# Patient Record
Sex: FEMALE | URBAN - METROPOLITAN AREA
[De-identification: names, ages, dates, MRNs, and addresses within clinical notes are randomized per-mention and may not be internally consistent; named-entity substitution may affect disease eponyms.]

---

## 2020-12-21 ENCOUNTER — OFFICE VISIT (OUTPATIENT)
Dept: URBAN - METROPOLITAN AREA CLINIC 48 | Facility: CLINIC | Age: 75
End: 2020-12-21
Payer: MEDICARE

## 2020-12-21 PROCEDURE — 92004 COMPRE OPH EXAM NEW PT 1/>: CPT | Performed by: STUDENT IN AN ORGANIZED HEALTH CARE EDUCATION/TRAINING PROGRAM

## 2020-12-21 NOTE — IMPRESSION/PLAN
Impression: Retinal artery branch occlusion, right eye: H34.231. Plan: Discussed with patient dx and how it stroke equivalent recommended immediate transfer to hospital. Patient declines ambulance. Express understanding of need to proceed to hospital immediately.  

Contacted Dr. Jolly Shah the ER physician on staff re. dx.

## 2021-01-15 ENCOUNTER — OFFICE VISIT (OUTPATIENT)
Dept: URBAN - METROPOLITAN AREA CLINIC 48 | Facility: CLINIC | Age: 76
End: 2021-01-15
Payer: MEDICARE

## 2021-01-15 PROCEDURE — 92014 COMPRE OPH EXAM EST PT 1/>: CPT | Performed by: STUDENT IN AN ORGANIZED HEALTH CARE EDUCATION/TRAINING PROGRAM

## 2021-01-15 PROCEDURE — 92134 CPTRZ OPH DX IMG PST SGM RTA: CPT | Performed by: STUDENT IN AN ORGANIZED HEALTH CARE EDUCATION/TRAINING PROGRAM

## 2021-01-15 ASSESSMENT — INTRAOCULAR PRESSURE
OD: 17
OS: 14

## 2021-01-15 NOTE — IMPRESSION/PLAN
Impression: Retinal artery branch occlusion, right eye: H34.231. Plan: Pt underwent carotid endartectomy following hospital evaluation Denies any additional changes Some persistent edema with early atrophy on OCT Small hollenhorst plaque seen inferiorly Continue to monitor with regular DFE
RTC precautions discussed with the patient

## 2021-04-01 ENCOUNTER — OFFICE VISIT (OUTPATIENT)
Dept: URBAN - METROPOLITAN AREA CLINIC 48 | Facility: CLINIC | Age: 76
End: 2021-04-01
Payer: MEDICARE

## 2021-04-01 PROCEDURE — 92012 INTRM OPH EXAM EST PATIENT: CPT | Performed by: STUDENT IN AN ORGANIZED HEALTH CARE EDUCATION/TRAINING PROGRAM

## 2021-04-01 ASSESSMENT — INTRAOCULAR PRESSURE
OS: 16
OD: 14

## 2021-04-01 NOTE — IMPRESSION/PLAN
Impression: Retinal artery branch occlusion, right eye: H34.231. Plan: Improving. No areas of occlusion or plaque seen on exam today. Patient aware to call if any new symptoms or concerns develop. RTC 1 month for DFE with OCT MAC.

## 2021-05-03 ENCOUNTER — OFFICE VISIT (OUTPATIENT)
Dept: URBAN - METROPOLITAN AREA CLINIC 48 | Facility: CLINIC | Age: 76
End: 2021-05-03
Payer: MEDICARE

## 2021-05-03 PROCEDURE — 92014 COMPRE OPH EXAM EST PT 1/>: CPT | Performed by: STUDENT IN AN ORGANIZED HEALTH CARE EDUCATION/TRAINING PROGRAM

## 2021-05-03 ASSESSMENT — INTRAOCULAR PRESSURE
OS: 16
OD: 18

## 2021-05-03 NOTE — IMPRESSION/PLAN
Impression: Retinal artery branch occlusion, right eye: H34.231. Plan: OD white w/out pressure temporally on today's exam not noted on previous exam. Will refer to Dr. Dixon Duque for second opinion. Discussed with patient continued monitoring to prevent changes leading to permanent damage. Patient aware to call if any new symptoms or concerns develop. RTC 3-4 wks. w/Dr. Dixon Duque  for DFE with OCT MAC.

## 2021-06-04 ENCOUNTER — OFFICE VISIT (OUTPATIENT)
Dept: URBAN - METROPOLITAN AREA CLINIC 48 | Facility: CLINIC | Age: 76
End: 2021-06-04
Payer: MEDICARE

## 2021-06-04 DIAGNOSIS — H34.239 RETINAL ARTERY BRANCH OCCLUSION, UNSPECIFIED EYE: Primary | ICD-10-CM

## 2021-06-04 PROCEDURE — 92014 COMPRE OPH EXAM EST PT 1/>: CPT | Performed by: STUDENT IN AN ORGANIZED HEALTH CARE EDUCATION/TRAINING PROGRAM

## 2021-06-04 ASSESSMENT — INTRAOCULAR PRESSURE
OS: 14
OD: 14

## 2021-06-04 NOTE — IMPRESSION/PLAN
Impression: Dry eye syndrome of bilateral lacrimal glands: H04.123. Plan: Dry eyes likely cause for the patient's complaints. There is no evidence of permanent changes to the cornea. Explained the nature of the condition and need for lubrication with artificial tears. OU Start AT QID recommend avoiding Visine or other gtts labeled to relief red eye. For itching pt. to use OTC Zaditor/Ketotifen BID and d/c once condition improves. Will monitor along w/plan 1, pt. aware to call if condition worsens prior to next ov.

## 2021-06-04 NOTE — IMPRESSION/PLAN
Impression: Retinal artery branch occlusion, unspecified eye: H34.239. No neovascularization noted on today's exam. Plan: Discussed with patient continue close monitoring, as changes in pressure may develop within 90 days after stroke.  


RTC 4 months for DFE (long exam)

## 2021-10-04 ENCOUNTER — OFFICE VISIT (OUTPATIENT)
Dept: URBAN - METROPOLITAN AREA CLINIC 48 | Facility: CLINIC | Age: 76
End: 2021-10-04
Payer: MEDICARE

## 2021-10-04 DIAGNOSIS — H34.231 RETINAL ARTERY BRANCH OCCLUSION, RIGHT EYE: Primary | ICD-10-CM

## 2021-10-04 DIAGNOSIS — H04.123 DRY EYE SYNDROME OF BILATERAL LACRIMAL GLANDS: ICD-10-CM

## 2021-10-04 PROCEDURE — 92014 COMPRE OPH EXAM EST PT 1/>: CPT | Performed by: STUDENT IN AN ORGANIZED HEALTH CARE EDUCATION/TRAINING PROGRAM

## 2021-10-04 PROCEDURE — 92020 GONIOSCOPY: CPT | Performed by: STUDENT IN AN ORGANIZED HEALTH CARE EDUCATION/TRAINING PROGRAM

## 2021-10-04 PROCEDURE — 92134 CPTRZ OPH DX IMG PST SGM RTA: CPT | Performed by: STUDENT IN AN ORGANIZED HEALTH CARE EDUCATION/TRAINING PROGRAM

## 2021-10-04 ASSESSMENT — INTRAOCULAR PRESSURE
OD: 15
OS: 18

## 2021-10-04 NOTE — IMPRESSION/PLAN
Impression: Dry eye syndrome of bilateral lacrimal glands: H04.123. Plan: 
OU Continue AFT QID previously recommend avoiding Visine or other gtts labeled to relief red eye. For itching pt. to continue  OTC Zaditor/Ketotifen BID and d/c once condition improves. Will monitor along w/plan 1, pt. aware to call if condition worsens prior to next ov.

## 2021-10-04 NOTE — IMPRESSION/PLAN
Impression: Retinal artery branch occlusion, right eye: H34.231. No neovascularization noted on today's exam.
OCT MAC Findings: OD superior retinal atrophy, OS Normal Plan: Discussed with patient continue close monitoring, as changes in pressure may develop within 90 days after stroke.  


RTC 6 months for DFE (long exam)